# Patient Record
(demographics unavailable — no encounter records)

---

## 2025-02-28 NOTE — DISCUSSION/SUMMARY
[de-identified] : Jose Luis Zarate is a 42-year-old male who presents to the office for evaluation of his left knee pain.  X-rays showed no obvious fractures or dislocations.  Examination showed good left knee range of motion.  Discussed with the patient the examination and imaging findings.  Discussed with patient that his pain is likely secondary to patellofemoral pain.  Discussed with patient the management of his knee pain at this time, including home exercises.  Patient will rest, ice, and elevate the knee at this time.  He will start home exercises.  Patient will follow-up as needed for reevaluation and management.  Patient understanding and in agreement with the plan.  All questions answered.   Plan: -Home exercises -Rest, ice, and elevate the knee -Follow-up as needed for reevaluation and management

## 2025-02-28 NOTE — HISTORY OF PRESENT ILLNESS
[de-identified] : Jose Luis Zarate is a 42-year-old male who presented to the office for evaluation of his left knee pain.  Patient had left knee pain today.  He went for a run and felt a crack in the knee.  He had anterior knee pain afterwards.  He is not taking pain medications.  No prior knee pain.  History: HTN

## 2025-02-28 NOTE — PHYSICAL EXAM
[de-identified] : Constitutional:  [    ], alert and oriented, cooperative, in no acute distress.  HEENT  NC/AT.  Appearance: symmetric  Neck/Back Straight without deformity or instability.  Good ROM.  Chest/Respiratory  Respiratory effort: no intercostal retractions or use of accessory muscles. Nonlabored Breathing  Skin  On inspection, warm and dry without rashes or lesions.  Mental Status:  Judgment, insight: intact Orientation: oriented to time, place, and person  Neurological: Sensory and Motor are grossly intact throughout  Left Knee  Inspection:     Skin intact, no rashes or lesions     No Effusion     Non-tender to palpation over tibial tubercle, patella, medial and lateral joint line, and pes insertion.     There is prior Osgood Schlatter's of the tibial tubercle  Range of Motion: 	Extension - 0 degrees 	Flexion - 120 degrees 	Extensor lag: None  Stability:      Demonstrates no Varus or Valgus instability      Negative Anterior or Posterior drawer.      Negative Lachman's  Patella: stable, tracks well.   Neurologic Exam     Motor intact including 5/5 Extensor Hallucis Longus, 5/5 Flexor Hallucis Longus, 5/5 Tibialis Anterior and 5/5 Gastrocnemius     Sensation Intact to Light Touch including Saphenous, Sural, Superficial Peroneal, Deep Peroneal, Tibial nerve distributions  Vascular Exam     Foot is warm and well perfused with 2+ Dorsalis Pedis Pulse   No pain with range of motion of the bilateral hips or right knee. No lumbar paraspinal muscle tenderness. [de-identified] : XRay:  XRays of the Left Knee (4 Views) taken in the office today and discussed with the patient. XRays demonstrate no obvious fracture or dislocation. There is no significant evidence of osteoarthritis or osteophyte formation. There is lateral patellar tracking seen on the [facility] view. There is prior Osgood Schlatter's of the tibial tubercle. (my personal interpretation).